# Patient Record
Sex: MALE | Race: OTHER | HISPANIC OR LATINO
[De-identification: names, ages, dates, MRNs, and addresses within clinical notes are randomized per-mention and may not be internally consistent; named-entity substitution may affect disease eponyms.]

---

## 2019-01-31 PROBLEM — Z00.00 ENCOUNTER FOR PREVENTIVE HEALTH EXAMINATION: Status: ACTIVE | Noted: 2019-01-31

## 2019-02-02 ENCOUNTER — APPOINTMENT (OUTPATIENT)
Dept: ORTHOPEDIC SURGERY | Facility: CLINIC | Age: 49
End: 2019-02-02
Payer: MEDICAID

## 2019-02-02 VITALS
DIASTOLIC BLOOD PRESSURE: 106 MMHG | WEIGHT: 219 LBS | BODY MASS INDEX: 35.2 KG/M2 | HEART RATE: 112 BPM | TEMPERATURE: 98.8 F | SYSTOLIC BLOOD PRESSURE: 150 MMHG | HEIGHT: 66 IN

## 2019-02-02 DIAGNOSIS — M25.561 PAIN IN RIGHT KNEE: ICD-10-CM

## 2019-02-02 DIAGNOSIS — Z86.39 PERSONAL HISTORY OF OTHER ENDOCRINE, NUTRITIONAL AND METABOLIC DISEASE: ICD-10-CM

## 2019-02-02 DIAGNOSIS — Z86.79 PERSONAL HISTORY OF OTHER DISEASES OF THE CIRCULATORY SYSTEM: ICD-10-CM

## 2019-02-02 DIAGNOSIS — S72.401A UNSPECIFIED FRACTURE OF LOWER END OF RIGHT FEMUR, INITIAL ENCOUNTER FOR CLOSED FRACTURE: ICD-10-CM

## 2019-02-02 PROCEDURE — 73564 X-RAY EXAM KNEE 4 OR MORE: CPT | Mod: RT

## 2019-02-02 PROCEDURE — 99203 OFFICE O/P NEW LOW 30 MIN: CPT

## 2019-02-02 RX ORDER — ATORVASTATIN CALCIUM 20 MG/1
20 TABLET, FILM COATED ORAL
Refills: 0 | Status: ACTIVE | COMMUNITY

## 2019-02-02 RX ORDER — AMLODIPINE BESYLATE 10 MG/1
10 TABLET ORAL
Refills: 0 | Status: ACTIVE | COMMUNITY

## 2019-02-02 RX ORDER — METFORMIN HYDROCHLORIDE 500 MG/1
500 TABLET, COATED ORAL
Refills: 0 | Status: ACTIVE | COMMUNITY

## 2019-02-02 RX ORDER — ASPIRIN 81 MG
81 TABLET, DELAYED RELEASE (ENTERIC COATED) ORAL
Refills: 0 | Status: ACTIVE | COMMUNITY

## 2019-02-02 RX ORDER — NYSTATIN 100000 [USP'U]/G
100000 CREAM TOPICAL
Refills: 0 | Status: ACTIVE | COMMUNITY

## 2019-02-02 NOTE — DISCUSSION/SUMMARY
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 Rodríguez will see in office to schedule surgery

## 2019-02-02 NOTE — REASON FOR VISIT
[Initial Visit] : an initial visit for [Knee Sprain] : knee sprain [Friend] : friend [FreeTextEntry2] : Right knee pain

## 2019-02-02 NOTE — PHYSICAL EXAM
[UE/LE] : Sensory: Intact in bilateral upper & lower extremities [ALL] : dorsalis pedis, posterior tibial, femoral, popliteal, and radial 2+ and symmetric bilaterally [Normal] : Oriented to person, place, and time, insight and judgement were intact and the affect was normal [Poor Appearance] : well-appearing [Acute Distress] : not in acute distress [de-identified] : \par FROM to hip\par \par Skin Warm, Dry, no erythema, no lesions \par \par Knee \par stable\par anatomic alignment\par ROM 0-130\par Valgus/Varus reveals laxity \par Effusion - Negative\par Crepitus - Negative \par Patella Grind - Negative \par Patella Apprehension - Negative \par Medial joint line tenderness - Negative\par Lateral Joint line tenderness - Negative\par Kay Test - Negative  \par Lachman test - POSITIVE \par Anterior Drawer Test -  POSITIVE\par \par Distal Motor Function intact \par Sensation intact to light touch bilaterally \par Pulses 2+ bilaterally\par  [de-identified] : 3 view right knee reveals 

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
## 2019-02-04 PROBLEM — S72.401A CLOSED FRACTURE OF DISTAL END OF RIGHT FEMUR, UNSPECIFIED FRACTURE MORPHOLOGY, INITIAL ENCOUNTER: Status: ACTIVE | Noted: 2019-02-02

## 2019-02-05 ENCOUNTER — APPOINTMENT (OUTPATIENT)
Dept: ORTHOPEDIC SURGERY | Facility: CLINIC | Age: 49
End: 2019-02-05
Payer: MEDICAID

## 2019-02-05 VITALS
HEART RATE: 116 BPM | TEMPERATURE: 98.9 F | BODY MASS INDEX: 35.36 KG/M2 | HEIGHT: 66 IN | SYSTOLIC BLOOD PRESSURE: 151 MMHG | DIASTOLIC BLOOD PRESSURE: 101 MMHG | WEIGHT: 220 LBS

## 2019-02-05 DIAGNOSIS — S72.401K: ICD-10-CM

## 2019-02-05 PROCEDURE — 99214 OFFICE O/P EST MOD 30 MIN: CPT

## 2019-02-05 NOTE — PHYSICAL EXAM
[UE/LE] : Sensory: Intact in bilateral upper & lower extremities [ALL] : dorsalis pedis, posterior tibial, femoral, popliteal, and radial 2+ and symmetric bilaterally [Normal] : Oriented to person, place, and time, insight and judgement were intact and the affect was normal [de-identified] : Xray from 1/31 of right knee reveals 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  [Poor Appearance] : well-appearing [Acute Distress] : not in acute distress [de-identified] : Physical Exam:\par General: Well appearing, no acute distress, A&O\par Neurologic: A&Ox3, No focal deficits\par Head: NCAT without abrasions, lacerations, or ecchymosis to head, face, or scalp\par Respiratory: Equal chest wall expansion bilaterally, no accessory muscle use\par Lymphatic: No lymphadenopathy palpated\par Skin: Warm and dry\par Psychiatric: Normal mood and affect\par \par FROM to hip\par \par Skin Warm, Dry, no erythema, no lesions \par \par Knee \par stable\par anatomic alignment\par ROM 0-130\par Valgus/Varus reveals laxity \par Effusion - Negative\par Crepitus - Negative \par Patella Grind - positive\par Patella Apprehension - Negative \par Medial joint line tenderness - Negative\par Lateral Joint line tenderness - Negative\par Kay Test - Negative  \par Lachman test - POSITIVE \par Anterior Drawer Test -  POSITIVE\par \par Distal Motor Function intact \par Sensation intact to light touch bilaterally \par Pulses 2+ bilaterally\par \par ambulates with anatalgic, varus thrust gait
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
## 2019-02-05 NOTE — DISCUSSION/SUMMARY
[de-identified] : Pt is 48y M with right distal femur intraarticular fracture, nonunion. Surgical intervention is recommended. Risks, benefits, alternatives discussed at length with patient and family.  Post operative recovery, including non-weight bearing for at least 4 weeks was reviewed as well. Patient would like to proceed with surgery, open reduction internal fixation. All questions answered and patient agrees with assessment/plan. \par \par Orthopaedic Trauma Surgeon Addendum:\par \par I agree with the above resident physician note.  Appropriate imaging has been reviewed and the plan adjusted as needed.\par \par It was explained to the patient that any surgical procedure carries with it the risks of loss of limb or loss of life. Medical complications include but are not limited to death or disability from a heart attack, stroke, GI bleeding, thrombophlebitis and pulmonary embolism, sepsis, adverse reactions including death due to blood transfusions, allergy or adverse drug reaction. There are other rare, unknown and uncommon systemic conditions that could also adversely affect the systemic outcome. Local complications include but are not limited to wound dehiscence, deep infection, failure of fixation or reconstruction, damage to nerves and vessels which could be temporary or permanent, as well as other rare, uncommon and unknown local complications that may necessitate re-operation, more complex orthopaedic reconstructions or amputation.\par \par The patient was given the opportunity to ask questions and all questions were answered to their satisfaction.\par \par Yash Rodríguez MD\par Orthopaedic Trauma Surgeon\par [facility]\par [facility]

## 2019-02-05 NOTE — REASON FOR VISIT
[Follow-Up Visit] : a follow-up visit for [Spouse] : spouse [Initial Visit] : an initial visit for [Knee Sprain] : knee sprain [Friend] : friend [FreeTextEntry2] : Right knee pain

## 2019-03-15 ENCOUNTER — APPOINTMENT (OUTPATIENT)
Dept: ORTHOPEDIC SURGERY | Facility: HOSPITAL | Age: 49
End: 2019-03-15

## 2021-06-18 ENCOUNTER — INPATIENT (INPATIENT)
Facility: HOSPITAL | Age: 51
LOS: 1 days | Discharge: HOME | DRG: 854 | End: 2021-06-20
Attending: HOSPITALIST | Admitting: HOSPITALIST
Payer: COMMERCIAL

## 2021-06-18 VITALS
HEART RATE: 99 BPM | HEIGHT: 66 IN | OXYGEN SATURATION: 96 % | DIASTOLIC BLOOD PRESSURE: 89 MMHG | RESPIRATION RATE: 19 BRPM | TEMPERATURE: 100 F | SYSTOLIC BLOOD PRESSURE: 145 MMHG | WEIGHT: 238.98 LBS

## 2021-06-18 DIAGNOSIS — M86.9 OSTEOMYELITIS, UNSPECIFIED: ICD-10-CM

## 2021-06-18 LAB
ALBUMIN SERPL ELPH-MCNC: 4.2 G/DL — SIGNIFICANT CHANGE UP (ref 3.3–5.2)
ALP SERPL-CCNC: 86 U/L — SIGNIFICANT CHANGE UP (ref 40–120)
ALT FLD-CCNC: 34 U/L — SIGNIFICANT CHANGE UP
ANION GAP SERPL CALC-SCNC: 12 MMOL/L — SIGNIFICANT CHANGE UP (ref 5–17)
AST SERPL-CCNC: 23 U/L — SIGNIFICANT CHANGE UP
BASOPHILS # BLD AUTO: 0.07 K/UL — SIGNIFICANT CHANGE UP (ref 0–0.2)
BASOPHILS NFR BLD AUTO: 0.5 % — SIGNIFICANT CHANGE UP (ref 0–2)
BILIRUB SERPL-MCNC: 0.3 MG/DL — LOW (ref 0.4–2)
BUN SERPL-MCNC: 17.1 MG/DL — SIGNIFICANT CHANGE UP (ref 8–20)
CALCIUM SERPL-MCNC: 9.6 MG/DL — SIGNIFICANT CHANGE UP (ref 8.6–10.2)
CHLORIDE SERPL-SCNC: 99 MMOL/L — SIGNIFICANT CHANGE UP (ref 98–107)
CO2 SERPL-SCNC: 25 MMOL/L — SIGNIFICANT CHANGE UP (ref 22–29)
CREAT SERPL-MCNC: 0.79 MG/DL — SIGNIFICANT CHANGE UP (ref 0.5–1.3)
CRP SERPL-MCNC: <4 MG/L — SIGNIFICANT CHANGE UP
EOSINOPHIL # BLD AUTO: 0.21 K/UL — SIGNIFICANT CHANGE UP (ref 0–0.5)
EOSINOPHIL NFR BLD AUTO: 1.6 % — SIGNIFICANT CHANGE UP (ref 0–6)
ERYTHROCYTE [SEDIMENTATION RATE] IN BLOOD: 18 MM/HR — SIGNIFICANT CHANGE UP (ref 0–20)
GLUCOSE SERPL-MCNC: 350 MG/DL — HIGH (ref 70–99)
HCT VFR BLD CALC: 39.8 % — SIGNIFICANT CHANGE UP (ref 39–50)
HGB BLD-MCNC: 13.2 G/DL — SIGNIFICANT CHANGE UP (ref 13–17)
IMM GRANULOCYTES NFR BLD AUTO: 0.6 % — SIGNIFICANT CHANGE UP (ref 0–1.5)
LACTATE BLDV-MCNC: 2.3 MMOL/L — HIGH (ref 0.5–2)
LACTATE BLDV-MCNC: 2.8 MMOL/L — HIGH (ref 0.5–2)
LYMPHOCYTES # BLD AUTO: 25.8 % — SIGNIFICANT CHANGE UP (ref 13–44)
LYMPHOCYTES # BLD AUTO: 3.44 K/UL — HIGH (ref 1–3.3)
MCHC RBC-ENTMCNC: 28.2 PG — SIGNIFICANT CHANGE UP (ref 27–34)
MCHC RBC-ENTMCNC: 33.2 GM/DL — SIGNIFICANT CHANGE UP (ref 32–36)
MCV RBC AUTO: 85 FL — SIGNIFICANT CHANGE UP (ref 80–100)
MONOCYTES # BLD AUTO: 0.97 K/UL — HIGH (ref 0–0.9)
MONOCYTES NFR BLD AUTO: 7.3 % — SIGNIFICANT CHANGE UP (ref 2–14)
NEUTROPHILS # BLD AUTO: 8.56 K/UL — HIGH (ref 1.8–7.4)
NEUTROPHILS NFR BLD AUTO: 64.2 % — SIGNIFICANT CHANGE UP (ref 43–77)
PLATELET # BLD AUTO: 336 K/UL — SIGNIFICANT CHANGE UP (ref 150–400)
POTASSIUM SERPL-MCNC: 4.2 MMOL/L — SIGNIFICANT CHANGE UP (ref 3.5–5.3)
POTASSIUM SERPL-SCNC: 4.2 MMOL/L — SIGNIFICANT CHANGE UP (ref 3.5–5.3)
PROT SERPL-MCNC: 7.5 G/DL — SIGNIFICANT CHANGE UP (ref 6.6–8.7)
RBC # BLD: 4.68 M/UL — SIGNIFICANT CHANGE UP (ref 4.2–5.8)
RBC # FLD: 12.4 % — SIGNIFICANT CHANGE UP (ref 10.3–14.5)
SARS-COV-2 RNA SPEC QL NAA+PROBE: SIGNIFICANT CHANGE UP
SODIUM SERPL-SCNC: 136 MMOL/L — SIGNIFICANT CHANGE UP (ref 135–145)
WBC # BLD: 13.33 K/UL — HIGH (ref 3.8–10.5)
WBC # FLD AUTO: 13.33 K/UL — HIGH (ref 3.8–10.5)

## 2021-06-18 PROCEDURE — 99223 1ST HOSP IP/OBS HIGH 75: CPT

## 2021-06-18 PROCEDURE — 99285 EMERGENCY DEPT VISIT HI MDM: CPT

## 2021-06-18 PROCEDURE — 73140 X-RAY EXAM OF FINGER(S): CPT | Mod: 26,RT

## 2021-06-18 RX ORDER — VANCOMYCIN HCL 1 G
1000 VIAL (EA) INTRAVENOUS ONCE
Refills: 0 | Status: COMPLETED | OUTPATIENT
Start: 2021-06-18 | End: 2021-06-18

## 2021-06-18 RX ORDER — KETOROLAC TROMETHAMINE 30 MG/ML
30 SYRINGE (ML) INJECTION ONCE
Refills: 0 | Status: DISCONTINUED | OUTPATIENT
Start: 2021-06-18 | End: 2021-06-18

## 2021-06-18 RX ORDER — PIPERACILLIN AND TAZOBACTAM 4; .5 G/20ML; G/20ML
3.38 INJECTION, POWDER, LYOPHILIZED, FOR SOLUTION INTRAVENOUS ONCE
Refills: 0 | Status: COMPLETED | OUTPATIENT
Start: 2021-06-18 | End: 2021-06-18

## 2021-06-18 RX ORDER — SODIUM CHLORIDE 9 MG/ML
2000 INJECTION INTRAMUSCULAR; INTRAVENOUS; SUBCUTANEOUS ONCE
Refills: 0 | Status: COMPLETED | OUTPATIENT
Start: 2021-06-18 | End: 2021-06-18

## 2021-06-18 RX ADMIN — Medication 30 MILLIGRAM(S): at 20:21

## 2021-06-18 RX ADMIN — SODIUM CHLORIDE 2000 MILLILITER(S): 9 INJECTION INTRAMUSCULAR; INTRAVENOUS; SUBCUTANEOUS at 21:45

## 2021-06-18 RX ADMIN — Medication 100 MILLIGRAM(S): at 20:57

## 2021-06-18 RX ADMIN — PIPERACILLIN AND TAZOBACTAM 200 GRAM(S): 4; .5 INJECTION, POWDER, LYOPHILIZED, FOR SOLUTION INTRAVENOUS at 20:21

## 2021-06-18 NOTE — H&P ADULT - PROBLEM SELECTOR PLAN 1
pt. had bedside I & D by ortho PA, packing done, cultures sent, will keep on vanco and zosyn. Hand surgeon dr. perez will see pt. in am, will request for ID consult.

## 2021-06-18 NOTE — H&P ADULT - NSHPPHYSICALEXAM_GEN_ALL_CORE
Vital Signs Last 24 Hrs  T(C): 36.8 (18 Jun 2021 23:31), Max: 37.7 (18 Jun 2021 18:18)  T(F): 98.2 (18 Jun 2021 23:31), Max: 99.8 (18 Jun 2021 18:18)  HR: 90 (18 Jun 2021 23:31) (90 - 99)  BP: 148/88 (18 Jun 2021 23:31) (145/89 - 148/88)  BP(mean): --  RR: 18 (18 Jun 2021 23:31) (18 - 19)  SpO2: 96% (18 Jun 2021 23:31) (96% - 96%)  General: Obese male lying in bed not in distress.   HEENT: AT, NC. PERRL. intact EOM. no throat erythema or exudate.   Neck: supple. no JVD.   Chest: CTA bilaterally  Heart: S1,S2. RRR. no heart murmur. no edema.   Abdomen: soft. non-tender. obese,+ BS.   Ext: no calf tenderness on both sides. Right Upper Extremity: +swelling, erythema and some pain of distal and middle phalanx of rt. 2nd digit. +2 small scabs (adjacent to nail bed on dorsal/radial side and on volar surface of distal phalanx). Small serosanguinous fluid able to be expressed from dorsal wound. No TTP. FROM of hand and digits. median/ulnar/radial nerve intact. Radial pulse 2+ B/L. left hand no wounds.   Neuro: AAO x3. no focal weakness. no speech disorder, cns ii to xii intact. m /r/s intact.   Skin: warm and dry, no pallor, no diaphoresis, other skin findings as in ext. exam.  psych : normal affect, no si/hi.

## 2021-06-18 NOTE — H&P ADULT - NSICDXFAMILYHX_GEN_ALL_CORE_FT
FAMILY HISTORY:  Family history of diabetes mellitus     FAMILY HISTORY:  Family history of diabetes mellitus, mother and sibling

## 2021-06-18 NOTE — ED PROVIDER NOTE - CLINICAL SUMMARY MEDICAL DECISION MAKING FREE TEXT BOX
50 y/o male with hx of HTN, DM, HLD presents to the ED c/o right finger pain and swelling of the 2nd digit. failed outpatient abx, fusiform swelling, with redness, no pain with passive extension, no pain with active flexion 50 y/o male with hx of HTN, DM, HLD presents to the ED c/o right finger pain and swelling of the 2nd digit. failed outpatient abx, fusiform swelling, with redness, no pain with passive extension, no pain with active flexion, xray concerning for osteo, elevated wbc, hand consulted, will admit to medicine

## 2021-06-18 NOTE — H&P ADULT - PROBLEM SELECTOR PROBLEM 3
Obesity (BMI 30-39.9) Type 2 diabetes mellitus with hyperglycemia, without long-term current use of insulin

## 2021-06-18 NOTE — ED ADULT NURSE NOTE - OBJECTIVE STATEMENT
pt presents with R pointer finger swelling and redness after pulling hang nail x 2 weeks. seen by PCP on amoxicillin, finished yesterday with no improvement. pt medicated as ordered. blood cultures drawn prior to administration of abx. pt updated on plan of care by PORTIA osuna.

## 2021-06-18 NOTE — ED PROVIDER NOTE - OBJECTIVE STATEMENT
50 y/o male with hx of HTN, DM, HLD presents to the ED c/o right finger pain and swelling of the 2nd digit. Notes began as a hang nail and has gradually worsened. Was seen 2 weeks ago by pcp and given amoxicillin for infection. Pt notes he just finished antibiotics yesterday but has not improved. Admits to some discharge of the finger. Generalized swelling. Denies fevers, chills, decreased range of motion, nausea, vomiting.

## 2021-06-18 NOTE — H&P ADULT - PROBLEM SELECTOR PLAN 2
will keep pt. on admelog. diabetic teaching. follow blood and abscess culture sent after I & D. VANCO / ZOSYN to continue, last lactate 2.3 , will give another bolus of NS and repeat lactate. pt's wbc [id number], hr 99, lactate initially 2.8 ,obvious source rt. finger felon, follow blood and abscess culture sent after I & D. VANCO / ZOSYN to continue, last lactate 2.3 , will give another bolus of NS and repeat lactate.

## 2021-06-18 NOTE — H&P ADULT - PROBLEM SELECTOR PLAN 3
pt. 's Obesity was discussed and pt. will benefit from losing wt. pt's DM appears uncontrolled, will keep pt. on admelog scale. lantus and diabetic teaching.

## 2021-06-18 NOTE — ED PROVIDER NOTE - ATTENDING CONTRIBUTION TO CARE
Patient with finger infection.  Lab values c/w infection.  XR with osteo.  ortho bedside.  abx given. patient admitted for definitive care.  VSS.  Non toxic.  Well appearing. Uneventful ED observation period.

## 2021-06-18 NOTE — ED ADULT NURSE NOTE - NSIMPLEMENTINTERV_GEN_ALL_ED
Implemented All Universal Safety Interventions:  [place] to call system. Call bell, personal items and telephone within reach. Instruct patient to call for assistance. Room bathroom lighting operational. Non-slip footwear when patient is off stretcher. Physically safe environment: no spills, clutter or unnecessary equipment. Stretcher in lowest position, wheels locked, appropriate side rails in place.

## 2021-06-18 NOTE — ED PROVIDER NOTE - CARE PLAN
Principal Discharge DX:	Osteomyelitis   Principal Discharge DX:	Osteomyelitis  Secondary Diagnosis:	Felon of finger

## 2021-06-18 NOTE — ED ADULT TRIAGE NOTE - CHIEF COMPLAINT QUOTE
Pt. sent in by MD for evaluation of right 2nd finger pain and swelling that began 1.5 weeks ago.  Pt. states "I gave myself ampicillin over the past week for this but it didn't help, my doctor told me to come in."  Pt. states wound began "as an ingrown nail."  Sensation intact.  Pt. denies pain.

## 2021-06-18 NOTE — ED PROVIDER NOTE - PHYSICAL EXAMINATION
General-alert and oriented to person place and time, nontoxic appearing, pleasant cooperative, NAD  HEENT-normocephalic, atraumatic, NT to palp, EOMI, PERRLA, no conjunctival injections,   Chest- Nt to palp, no reproducible pain  Cardio-s1,s2 present, regular rate and rhythm  Resp- talks in full sentences, symmetrical chest rise, CTA bilat, no evidence of wheezes, rhonchi noted  Abdomen- bowel sounds present in all 4 quadrants, soft, NT/ND, no guarding, no rebound tenderness  MSK- moves all extremities, FROM of the finger, no pain with passive extension, no pain with active flexion  Back- nt to palp of cervical, thoracic, lumbar spine, nt to palp of paraspinal m., No CVA tenderness  Neuro- no focal deficits, sensation intact   Skin- fusiform swelling of the 2nd digit, scab noted to the medial cuticle and palmar aspect, no active discharge

## 2021-06-18 NOTE — H&P ADULT - ASSESSMENT
pt. is a 52 y/o male with hx of HTN, DM, HLD presents to the ED c/o right 2nd finger pain and swelling mostly in the middle and distal phalanx area. As per pt. began as a hang nail and has gradually worsened. pt. was amoxicillin for infection which he likely got it from a [place]. Pt notes he just finished antibiotics yesterday but has not improved. Admits to some discharge of the finger.Denies fevers, chills, decreased range of motion of rt. hand or fingers , nausea, vomiting. no cp, no sob, no abd. pain, no n/v/d. pt. stated that is only medicine is metformin.

## 2021-06-18 NOTE — H&P ADULT - HISTORY OF PRESENT ILLNESS
pt. is a 50 y/o male with hx of HTN, DM, HLD presents to the ED c/o right 2nd finger pain mostly in the distal phalanx area. As per pt. began as a hang nail and has gradually worsened. Was seen 2 weeks ago by pcp and given amoxicillin for infection. Pt notes he just finished antibiotics yesterday but has not improved. Admits to some discharge of the finger.Denies fevers, chills, decreased range of motion of rt. hand or fingers , nausea, vomiting. no cp, no sob, no abd. pain, no n/v/d.  pt. is a 50 y/o male with hx of HTN, DM, HLD presents to the ED c/o right 2nd finger pain and swelling mostly in the middle and distal phalanx area. As per pt. began as a hang nail and has gradually worsened. Was seen 2 weeks ago by pcp and given amoxicillin for infection. Pt notes he just finished antibiotics yesterday but has not improved. Admits to some discharge of the finger.Denies fevers, chills, decreased range of motion of rt. hand or fingers , nausea, vomiting. no cp, no sob, no abd. pain, no n/v/d. pt. stated that is only medicine is metformin.  pt. is a 52 y/o male with hx of HTN, DM, HLD presents to the ED c/o right 2nd finger pain and swelling mostly in the middle and distal phalanx area. As per pt. began as a hang nail and has gradually worsened. pt. was amoxicillin for infection which he likely got it from a [place]. Pt notes he just finished antibiotics yesterday but has not improved. Admits to some discharge of the finger.Denies fevers, chills, decreased range of motion of rt. hand or fingers , nausea, vomiting. no cp, no sob, no abd. pain, no n/v/d. pt. stated that is only medicine is metformin.

## 2021-06-19 DIAGNOSIS — E11.65 TYPE 2 DIABETES MELLITUS WITH HYPERGLYCEMIA: ICD-10-CM

## 2021-06-19 DIAGNOSIS — A41.9 SEPSIS, UNSPECIFIED ORGANISM: ICD-10-CM

## 2021-06-19 DIAGNOSIS — E66.9 OBESITY, UNSPECIFIED: ICD-10-CM

## 2021-06-19 DIAGNOSIS — L03.011 CELLULITIS OF RIGHT FINGER: ICD-10-CM

## 2021-06-19 LAB
A1C WITH ESTIMATED AVERAGE GLUCOSE RESULT: 10.3 % — HIGH (ref 4–5.6)
ANION GAP SERPL CALC-SCNC: 9 MMOL/L — SIGNIFICANT CHANGE UP (ref 5–17)
BASOPHILS # BLD AUTO: 0.05 K/UL — SIGNIFICANT CHANGE UP (ref 0–0.2)
BASOPHILS NFR BLD AUTO: 0.4 % — SIGNIFICANT CHANGE UP (ref 0–2)
BUN SERPL-MCNC: 18.3 MG/DL — SIGNIFICANT CHANGE UP (ref 8–20)
CALCIUM SERPL-MCNC: 8.3 MG/DL — LOW (ref 8.6–10.2)
CHLORIDE SERPL-SCNC: 105 MMOL/L — SIGNIFICANT CHANGE UP (ref 98–107)
CHOLEST SERPL-MCNC: 118 MG/DL — SIGNIFICANT CHANGE UP
CO2 SERPL-SCNC: 23 MMOL/L — SIGNIFICANT CHANGE UP (ref 22–29)
CREAT SERPL-MCNC: 0.69 MG/DL — SIGNIFICANT CHANGE UP (ref 0.5–1.3)
EOSINOPHIL # BLD AUTO: 0.25 K/UL — SIGNIFICANT CHANGE UP (ref 0–0.5)
EOSINOPHIL NFR BLD AUTO: 2 % — SIGNIFICANT CHANGE UP (ref 0–6)
ESTIMATED AVERAGE GLUCOSE: 249 MG/DL — HIGH (ref 68–114)
GLUCOSE BLDC GLUCOMTR-MCNC: 244 MG/DL — HIGH (ref 70–99)
GLUCOSE BLDC GLUCOMTR-MCNC: 253 MG/DL — HIGH (ref 70–99)
GLUCOSE BLDC GLUCOMTR-MCNC: 270 MG/DL — HIGH (ref 70–99)
GLUCOSE BLDC GLUCOMTR-MCNC: 272 MG/DL — HIGH (ref 70–99)
GLUCOSE BLDC GLUCOMTR-MCNC: 276 MG/DL — HIGH (ref 70–99)
GLUCOSE BLDC GLUCOMTR-MCNC: 299 MG/DL — HIGH (ref 70–99)
GLUCOSE BLDC GLUCOMTR-MCNC: 328 MG/DL — HIGH (ref 70–99)
GLUCOSE SERPL-MCNC: 324 MG/DL — HIGH (ref 70–99)
HCT VFR BLD CALC: 35.9 % — LOW (ref 39–50)
HDLC SERPL-MCNC: 29 MG/DL — LOW
HGB BLD-MCNC: 11.8 G/DL — LOW (ref 13–17)
IMM GRANULOCYTES NFR BLD AUTO: 0.5 % — SIGNIFICANT CHANGE UP (ref 0–1.5)
LACTATE BLDV-MCNC: 2.1 MMOL/L — HIGH (ref 0.5–2)
LIPID PNL WITH DIRECT LDL SERPL: 49 MG/DL — SIGNIFICANT CHANGE UP
LYMPHOCYTES # BLD AUTO: 26.4 % — SIGNIFICANT CHANGE UP (ref 13–44)
LYMPHOCYTES # BLD AUTO: 3.3 K/UL — SIGNIFICANT CHANGE UP (ref 1–3.3)
MCHC RBC-ENTMCNC: 28.5 PG — SIGNIFICANT CHANGE UP (ref 27–34)
MCHC RBC-ENTMCNC: 32.9 GM/DL — SIGNIFICANT CHANGE UP (ref 32–36)
MCV RBC AUTO: 86.7 FL — SIGNIFICANT CHANGE UP (ref 80–100)
MONOCYTES # BLD AUTO: 1.07 K/UL — HIGH (ref 0–0.9)
MONOCYTES NFR BLD AUTO: 8.5 % — SIGNIFICANT CHANGE UP (ref 2–14)
NEUTROPHILS # BLD AUTO: 7.79 K/UL — HIGH (ref 1.8–7.4)
NEUTROPHILS NFR BLD AUTO: 62.2 % — SIGNIFICANT CHANGE UP (ref 43–77)
NON HDL CHOLESTEROL: 89 MG/DL — SIGNIFICANT CHANGE UP
PLATELET # BLD AUTO: 288 K/UL — SIGNIFICANT CHANGE UP (ref 150–400)
POTASSIUM SERPL-MCNC: 4.2 MMOL/L — SIGNIFICANT CHANGE UP (ref 3.5–5.3)
POTASSIUM SERPL-SCNC: 4.2 MMOL/L — SIGNIFICANT CHANGE UP (ref 3.5–5.3)
RBC # BLD: 4.14 M/UL — LOW (ref 4.2–5.8)
RBC # FLD: 12.5 % — SIGNIFICANT CHANGE UP (ref 10.3–14.5)
SODIUM SERPL-SCNC: 137 MMOL/L — SIGNIFICANT CHANGE UP (ref 135–145)
TRIGL SERPL-MCNC: 199 MG/DL — HIGH
WBC # BLD: 12.52 K/UL — HIGH (ref 3.8–10.5)
WBC # FLD AUTO: 12.52 K/UL — HIGH (ref 3.8–10.5)

## 2021-06-19 PROCEDURE — 99233 SBSQ HOSP IP/OBS HIGH 50: CPT

## 2021-06-19 PROCEDURE — 99222 1ST HOSP IP/OBS MODERATE 55: CPT

## 2021-06-19 RX ORDER — SODIUM CHLORIDE 9 MG/ML
1000 INJECTION, SOLUTION INTRAVENOUS
Refills: 0 | Status: DISCONTINUED | OUTPATIENT
Start: 2021-06-19 | End: 2021-06-20

## 2021-06-19 RX ORDER — INSULIN LISPRO 100/ML
5 VIAL (ML) SUBCUTANEOUS ONCE
Refills: 0 | Status: COMPLETED | OUTPATIENT
Start: 2021-06-19 | End: 2021-06-19

## 2021-06-19 RX ORDER — ENOXAPARIN SODIUM 100 MG/ML
40 INJECTION SUBCUTANEOUS DAILY
Refills: 0 | Status: DISCONTINUED | OUTPATIENT
Start: 2021-06-19 | End: 2021-06-20

## 2021-06-19 RX ORDER — INSULIN LISPRO 100/ML
2 VIAL (ML) SUBCUTANEOUS
Refills: 0 | Status: DISCONTINUED | OUTPATIENT
Start: 2021-06-19 | End: 2021-06-19

## 2021-06-19 RX ORDER — INSULIN GLARGINE 100 [IU]/ML
12 INJECTION, SOLUTION SUBCUTANEOUS ONCE
Refills: 0 | Status: COMPLETED | OUTPATIENT
Start: 2021-06-19 | End: 2021-06-19

## 2021-06-19 RX ORDER — GLUCAGON INJECTION, SOLUTION 0.5 MG/.1ML
1 INJECTION, SOLUTION SUBCUTANEOUS ONCE
Refills: 0 | Status: DISCONTINUED | OUTPATIENT
Start: 2021-06-19 | End: 2021-06-20

## 2021-06-19 RX ORDER — INSULIN LISPRO 100/ML
5 VIAL (ML) SUBCUTANEOUS
Refills: 0 | Status: DISCONTINUED | OUTPATIENT
Start: 2021-06-19 | End: 2021-06-20

## 2021-06-19 RX ORDER — PIPERACILLIN AND TAZOBACTAM 4; .5 G/20ML; G/20ML
3.38 INJECTION, POWDER, LYOPHILIZED, FOR SOLUTION INTRAVENOUS EVERY 8 HOURS
Refills: 0 | Status: DISCONTINUED | OUTPATIENT
Start: 2021-06-19 | End: 2021-06-20

## 2021-06-19 RX ORDER — LISINOPRIL 2.5 MG/1
10 TABLET ORAL DAILY
Refills: 0 | Status: DISCONTINUED | OUTPATIENT
Start: 2021-06-19 | End: 2021-06-20

## 2021-06-19 RX ORDER — DEXTROSE 50 % IN WATER 50 %
12.5 SYRINGE (ML) INTRAVENOUS ONCE
Refills: 0 | Status: DISCONTINUED | OUTPATIENT
Start: 2021-06-19 | End: 2021-06-20

## 2021-06-19 RX ORDER — INSULIN GLARGINE 100 [IU]/ML
12 INJECTION, SOLUTION SUBCUTANEOUS AT BEDTIME
Refills: 0 | Status: DISCONTINUED | OUTPATIENT
Start: 2021-06-19 | End: 2021-06-19

## 2021-06-19 RX ORDER — DEXTROSE 50 % IN WATER 50 %
25 SYRINGE (ML) INTRAVENOUS ONCE
Refills: 0 | Status: DISCONTINUED | OUTPATIENT
Start: 2021-06-19 | End: 2021-06-20

## 2021-06-19 RX ORDER — INSULIN LISPRO 100/ML
VIAL (ML) SUBCUTANEOUS
Refills: 0 | Status: DISCONTINUED | OUTPATIENT
Start: 2021-06-19 | End: 2021-06-20

## 2021-06-19 RX ORDER — VANCOMYCIN HCL 1 G
1250 VIAL (EA) INTRAVENOUS EVERY 12 HOURS
Refills: 0 | Status: DISCONTINUED | OUTPATIENT
Start: 2021-06-19 | End: 2021-06-20

## 2021-06-19 RX ORDER — SODIUM CHLORIDE 9 MG/ML
1000 INJECTION INTRAMUSCULAR; INTRAVENOUS; SUBCUTANEOUS ONCE
Refills: 0 | Status: COMPLETED | OUTPATIENT
Start: 2021-06-19 | End: 2021-06-19

## 2021-06-19 RX ORDER — DEXTROSE 50 % IN WATER 50 %
15 SYRINGE (ML) INTRAVENOUS ONCE
Refills: 0 | Status: DISCONTINUED | OUTPATIENT
Start: 2021-06-19 | End: 2021-06-20

## 2021-06-19 RX ORDER — INSULIN GLARGINE 100 [IU]/ML
15 INJECTION, SOLUTION SUBCUTANEOUS AT BEDTIME
Refills: 0 | Status: DISCONTINUED | OUTPATIENT
Start: 2021-06-19 | End: 2021-06-20

## 2021-06-19 RX ORDER — ACETAMINOPHEN 500 MG
650 TABLET ORAL EVERY 6 HOURS
Refills: 0 | Status: DISCONTINUED | OUTPATIENT
Start: 2021-06-19 | End: 2021-06-20

## 2021-06-19 RX ORDER — LACTOBACILLUS ACIDOPHILUS 100MM CELL
1 CAPSULE ORAL
Refills: 0 | Status: DISCONTINUED | OUTPATIENT
Start: 2021-06-19 | End: 2021-06-20

## 2021-06-19 RX ORDER — INSULIN LISPRO 100/ML
VIAL (ML) SUBCUTANEOUS AT BEDTIME
Refills: 0 | Status: DISCONTINUED | OUTPATIENT
Start: 2021-06-19 | End: 2021-06-20

## 2021-06-19 RX ADMIN — Medication 1 TABLET(S): at 09:15

## 2021-06-19 RX ADMIN — Medication 1 TABLET(S): at 12:59

## 2021-06-19 RX ADMIN — Medication 5 UNIT(S): at 17:21

## 2021-06-19 RX ADMIN — Medication 250 MILLIGRAM(S): at 00:51

## 2021-06-19 RX ADMIN — PIPERACILLIN AND TAZOBACTAM 25 GRAM(S): 4; .5 INJECTION, POWDER, LYOPHILIZED, FOR SOLUTION INTRAVENOUS at 13:30

## 2021-06-19 RX ADMIN — Medication 5 UNIT(S): at 01:01

## 2021-06-19 RX ADMIN — Medication 6: at 17:21

## 2021-06-19 RX ADMIN — Medication 1 TABLET(S): at 17:21

## 2021-06-19 RX ADMIN — PIPERACILLIN AND TAZOBACTAM 25 GRAM(S): 4; .5 INJECTION, POWDER, LYOPHILIZED, FOR SOLUTION INTRAVENOUS at 22:32

## 2021-06-19 RX ADMIN — INSULIN GLARGINE 15 UNIT(S): 100 INJECTION, SOLUTION SUBCUTANEOUS at 22:33

## 2021-06-19 RX ADMIN — Medication 166.67 MILLIGRAM(S): at 13:00

## 2021-06-19 RX ADMIN — INSULIN GLARGINE 12 UNIT(S): 100 INJECTION, SOLUTION SUBCUTANEOUS at 04:38

## 2021-06-19 RX ADMIN — Medication 5 UNIT(S): at 12:55

## 2021-06-19 RX ADMIN — Medication 4: at 11:26

## 2021-06-19 RX ADMIN — ENOXAPARIN SODIUM 40 MILLIGRAM(S): 100 INJECTION SUBCUTANEOUS at 12:59

## 2021-06-19 RX ADMIN — LISINOPRIL 10 MILLIGRAM(S): 2.5 TABLET ORAL at 09:15

## 2021-06-19 RX ADMIN — Medication 2 UNIT(S): at 08:03

## 2021-06-19 RX ADMIN — Medication 2: at 22:33

## 2021-06-19 RX ADMIN — SODIUM CHLORIDE 1000 MILLILITER(S): 9 INJECTION INTRAMUSCULAR; INTRAVENOUS; SUBCUTANEOUS at 00:52

## 2021-06-19 RX ADMIN — Medication 6: at 08:01

## 2021-06-19 RX ADMIN — Medication 30 MILLIGRAM(S): at 09:05

## 2021-06-19 RX ADMIN — Medication 5 MILLIGRAM(S): at 06:35

## 2021-06-19 RX ADMIN — PIPERACILLIN AND TAZOBACTAM 25 GRAM(S): 4; .5 INJECTION, POWDER, LYOPHILIZED, FOR SOLUTION INTRAVENOUS at 05:44

## 2021-06-19 RX ADMIN — SODIUM CHLORIDE 125 MILLILITER(S): 9 INJECTION, SOLUTION INTRAVENOUS at 22:32

## 2021-06-19 NOTE — CONSULT NOTE ADULT - SUBJECTIVE AND OBJECTIVE BOX
ORTHO-HAND SERVICE    Pt Name: OSCAR REYES  MRN: [id number]      Patient is a 51y Male presenting to the emergency department with a chief complaint of right 2nd finger infection. Patient seen was assistance of ED . Patient states that about 2 weeks ago he had a hang nail that he pulled on. Since then his finger started getting swollen and red. He self-prescribed himself with penicillin from a [facility]odega which didn't improve symptoms. Finished antibiotic course yesterday. Starting last night finger started draining  blood/pus, but has since stopped. States that the swelling causes his skin to peel and he manually removed skin. Denies numbness/tingling, denies any pain currently. Denies fevers/chills. No other orthopedic complaints.          REVIEW OF SYSTEMS    General: Alert, responsive, in NAD    Skin/Breast: No rashes, no pruritis     Respiratory and Thorax: No difficulty breathing. No cough.  	   Cardiovascular: No chest pain. No palpitations.    Gastrointestinal: No abdominal pain. No diarrhea.     Musculoskeletal: SEE HPI.    Neurological: No sensory or motor changes.     ROS is otherwise negative.      PAST MEDICAL & SURGICAL HISTORY:  PAST MEDICAL & SURGICAL HISTORY:  HTN (hypertension)    Diabetes    HLD (hyperlipidemia)    No significant past surgical history        Allergies: No Known Allergies      Medications: vancomycin  IVPB. 1000 milliGRAM(s) IV Intermittent once      FAMILY HISTORY:  Family history of diabetes mellitus    : non-contributory    Social History:   Social History:      Daily Height in cm: 167.64 (18 Jun 2021 18:18)    Daily                             13.2   13.33 )-----------( 336      ( 18 Jun 2021 20:35 )             39.8       06-18    136  |  99  |  17.1  ----------------------------<  350<H>  4.2   |  25.0  |  0.79    Ca    9.6      18 Jun 2021 20:35    TPro  7.5  /  Alb  4.2  /  TBili  0.3<L>  /  DBili  x   /  AST  23  /  ALT  34  /  AlkPhos  86  06-18        PHYSICAL EXAM:    Appearance: Alert, responsive, in no acute distress.    Musculoskeletal:       Right Upper Extremity: +swelling, erythema of distal and middle phalanx of 2nd digit. +2 small scabs (adjacent to nail bed on dorsal/radial side and on volar surface of distal phalanx). Small serosanguinous fluid able to be expressed from dorsal wound. No TTP. FROM of hand and digits. median/ulnar/radial nerve intact. Radial pulse 2+         Imaging Studies:  official reads pending  Xray fingers right: +2nd digit distal phalanx with evidence of possible osteomyelitis/bone loss        A/P:  Pt is a  51y Male with right 2nd digit felon    PLAN:   * Bedside I&D performed  * F/U cultures  * IV ABX, recc ID consult  * elevation  * packing changes  * monitor for improvement        Incision and Drainage  PROCEDURE NOTE: incision and drainage    Performed by: Freddie Patiño PA-C    Indication: abscess/felon    Consent: the risks and benefits of incision and drainage discussed with patient, including the risk of bleeding, infection, and technical failure. The risks of not performing the procedure, failure to diagnose septic joint with resultant systemic infection, discussed with the patient. The alternatives of performing the procedure also discussed. Written consent was obtained following the discussion.  assisted with consent.    Universal Protocol: A time out was performed and the correct patient and site were verified.    The affected site was prepped and draped in proper sterile fashion. The right second digit was anesthetized with 10 ml of 1% lidocaine. Finger tourniquet used for hemostasis. A #15 blade was used to create an incision over the area of fluctuation. Purulent fluid was drained from incision. Cultures were taken. The site was packed. A dry sterile dressing was applied to site. The fluid was then sent to the lab for appropriate studies. Tourniquet was removed. The site was bandaged and no complications were noted. The patient tolerated the procedure well.    Post-Procedure Diagnosis: 2nd digit felon  Complications: None  Specimens Removed: fluid only  Prosthetic devices/implants:  none
[facility]  INFECTIOUS DISEASES AND INTERNAL MEDICINE at [place]  =======================================================  Kristian Walker MD  Diplomates American Board of Internal Medicine and Infectious Diseases  Tel  [phone]  Fax [phone]  =======================================================    [facility]-[id number]  OSCAR REYES   HPI:  This  52 y/o male with hx of HTN, DM, HLD presents to the ED c/o right 2nd finger pain and swelling mostly in the middle and distal phalanx area. As per pt. began as a hang nail and has gradually worsened. pt. was amoxicillin for infection which he likely got it from a [place] Pt notes he just finished antibiotics yesterday but has not improved. Admits to some discharge of the finger. Denies fevers, chills, decreased range of motion of rt. hand or fingers , nausea, vomiting. no cp, no sob, no abd. pain, no n/v/d. pt. stated that is only medicine is metformin.  (18 Jun 2021 23:58)    he presented to his PMD after 10 days and the was sent to the ER>   s/p I&D of the right medial #2 finger tip.  packing in place    has some pain.   denies nail biting   works as a     I have personally reviewed the labs and data; pertinent labs and data are listed in this note; please see below.   =======================================================  Past Medical & Surgical Hx:  =====================  PAST MEDICAL & SURGICAL HISTORY:  HTN (hypertension)    Diabetes    HLD (hyperlipidemia)    No significant past surgical history      Problem List:  ==========  HEALTH ISSUES - PROBLEM Dx:  Felon of finger of right hand  Felon of finger of right hand    Type 2 diabetes mellitus with hyperglycemia, without long-term current use of insulin  Type 2 diabetes mellitus with hyperglycemia, without long-term current use of insulin    Obesity (BMI 30-39.9)  Obesity (BMI 30-39.9)    Sepsis without acute organ dysfunction, due to unspecified organism  Sepsis without acute organ dysfunction, due to unspecified organism           Social Hx:  =======  no toxic habits currently    FAMILY HISTORY:  Family history of diabetes mellitus  mother and sibling    no significant family history of immunosuppressive disorders in mother or father   =======================================================    REVIEW OF SYSTEMS:  CONSTITUTIONAL:  No Fever or chills  HEENT:  No diplopia or blurred vision.  No earache, sore throat or runny nose.  CARDIOVASCULAR:  No pressure, squeezing, strangling, tightness, heaviness or aching about the chest, neck, axilla or epigastrium.  RESPIRATORY:  No cough, shortness of breath  GASTROINTESTINAL:  No nausea, vomiting or diarrhea.  GENITOURINARY:  No dysuria, frequency or urgency. No Blood in urine  MUSCULOSKELETAL:  no joint aches, no muscle pain  SKIN: as per HPI  NEUROLOGIC:  No Headaches, seizures or weakness.  PSYCHIATRIC:  No disorder of thought or mood.  ENDOCRINE:  No heat or cold intolerance  HEMATOLOGICAL:  No easy bruising or bleeding.    =======================================================  Allergies    No Known Allergies    Intolerances    Antibiotics:  piperacillin/tazobactam IVPB.. 3.375 Gram(s) IV Intermittent every 8 hours  vancomycin  IVPB 1250 milliGRAM(s) IV Intermittent every 12 hours    Other medications:  dextrose 40% Gel 15 Gram(s) Oral once  dextrose 5%. 1000 milliLiter(s) IV Continuous <Continuous>  dextrose 5%. 1000 milliLiter(s) IV Continuous <Continuous>  dextrose 50% Injectable 25 Gram(s) IV Push once  dextrose 50% Injectable 12.5 Gram(s) IV Push once  dextrose 50% Injectable 25 Gram(s) IV Push once  enoxaparin Injectable 40 milliGRAM(s) SubCutaneous daily  glucagon  Injectable 1 milliGRAM(s) IntraMuscular once  insulin glargine Injectable (LANTUS) 15 Unit(s) SubCutaneous at bedtime  insulin lispro (ADMELOG) corrective regimen sliding scale   SubCutaneous three times a day before meals  insulin lispro (ADMELOG) corrective regimen sliding scale   SubCutaneous at bedtime  insulin lispro Injectable (ADMELOG) 5 Unit(s) SubCutaneous three times a day with meals  lactated ringers. 1000 milliLiter(s) IV Continuous <Continuous>  lactobacillus acidophilus 1 Tablet(s) Oral three times a day with meals  lisinopril 10 milliGRAM(s) Oral daily     clindamycin IVPB   100 mL/Hr IV Intermittent (06-18-21 @ 20:57)    piperacillin/tazobactam IVPB..   25 mL/Hr IV Intermittent (06-19-21 @ 05:44)    piperacillin/tazobactam IVPB...   200 mL/Hr IV Intermittent (06-18-21 @ 20:21)    vancomycin  IVPB.   250 mL/Hr IV Intermittent (06-19-21 @ 00:51)      ======================================================  Physical Exam:  ============  T(F): 98.6 (19 Jun 2021 11:32), Max: 99.8 (18 Jun 2021 18:18)  HR: 86 (19 Jun 2021 11:32)  BP: 157/106 (19 Jun 2021 11:32)  RR: 19 (19 Jun 2021 08:03)  SpO2: 95% (19 Jun 2021 08:03) (94% - 96%)  temp max in last 48H T(F): , Max: 99.8 (06-18-21 @ 18:18)Height (cm): 167.6 (06-18-21 @ 18:18)  Weight (kg): 108.4 (06-18-21 @ 18:18)  BMI (kg/m2): 38.6 (06-18-21 @ 18:18)  BSA (m2): 2.16 (06-18-21 @ 18:18)    General:  No acute distress.  OBESE  Eye: Pupils are equal, round and reactive to light, Extraocular movements are intact, Normal conjunctiva.  HENT: Normocephalic, Oral mucosa is moist, No pharyngeal erythema, No sinus tenderness.  Neck: Supple, No lymphadenopathy.  Respiratory: Lungs are clear to auscultation, Respirations are non-labored.  Cardiovascular: Normal rate, Regular rhythm,   Gastrointestinal: Soft, Non-tender, Non-distended, Normal bowel sounds.  Genitourinary: No costovertebral angle tenderness.  Lymphatics: No lymphadenopathy neck,   Musculoskeletal: Normal range of motion, Normal strength.  Integumentary: RIGHT #2 finger tip with incision and PACKING in the medial finger.  FINGER TIP ENLARGED AND RED.   Neurologic: Alert, Oriented, No focal deficits, Cranial Nerves II-XII are grossly intact.  Psychiatric: Appropriate mood & affect.    =======================================================  Labs:                        11.8   12.52 )-----------( 288      ( 19 Jun 2021 04:23 )             35.9     06-19    137  |  105  |  18.3  ----------------------------<  324<H>  4.2   |  23.0  |  0.69    Ca    8.3<L>      19 Jun 2021 04:23    TPro  7.5  /  Alb  4.2  /  TBili  0.3<L>  /  DBili  x   /  AST  23  /  ALT  34  /  AlkPhos  86  06-18      Creatinine, Serum: 0.69 mg/dL (06-19-21 @ 04:23)  Creatinine, Serum: 0.79 mg/dL (06-18-21 @ 20:35)    C-Reactive Protein, Serum: <4 mg/L (06-18-21 @ 20:35)    Sedimentation Rate, Erythrocyte: 18 mm/hr (06-18-21 @ 20:35)        COVID-19 PCR: NotDetec (06-18-21 @ 21:08)       A1C with Estimated Average Glucose Result: 10.3 % (06-19-21 @ 04:23)

## 2021-06-19 NOTE — CONSULT NOTE ADULT - ASSESSMENT
This  52 y/o male with hx of HTN, DM, HLD presents to the ED c/o right 2nd finger pain and swelling mostly in the middle and distal phalanx area. As per pt. began as a hang nail and has gradually worsened. pt. was amoxicillin for infection which he likely got it from a [place] Pt notes he just finished antibiotics yesterday but has not improved. Admits to some discharge of the finger. Denies fevers, chills, decreased range of motion of rt. hand or fingers , nausea, vomiting. no cp, no sob, no abd. pain, no n/v/d. pt. stated that is only medicine is metformin.  (18 Jun 2021 23:58)    he presented to his PMD after 10 days and the was sent to the ER>   s/p I&D of the right medial #2 finger tip.  packing in place    has some pain.   denies nail biting   works as a     Impression:  finger infection  finger pain  WBC elevation  diabetes in poor control      Plan:  continue current antibiotics:  piperacillin/tazobactam IVPB.. 3.375 Gram(s) IV Intermittent every 8 hours  vancomycin  IVPB 1250 milliGRAM(s) IV Intermittent every 12 hours    Regarding Diabetes - poor control  - needs adequate control for wound healing  - most recent A1c is: 10.3    WBC elevation is reactive  - will follow and trend    - follow up all outstanding cultures - WOUND and BLOOD  - trend temperature and WBC curve  - repeat cultures from blood and all sources if febrile.

## 2021-06-19 NOTE — PROGRESS NOTE ADULT - SUBJECTIVE AND OBJECTIVE BOX
OSCAR REYES    [id number]    51y      Male    Patient is a 51y old  Male who presents with a chief complaint of     INTERVAL HPI/OVERNIGHT EVENTS:    Patient pain in the finger is well controlled, has no fever, chills, chest pain, nausea, vomiting, dizziness   REVIEW OF SYSTEMS:    CONSTITUTIONAL: No fever, some fatigue  RESPIRATORY: No cough, No shortness of breath  CARDIOVASCULAR: No chest pain, palpitations  GASTROINTESTINAL: No abdominal, No nausea, vomiting  NEUROLOGICAL: No headaches,  loss of strength.  MISCELLANEOUS: right 2nd finger pain is under control       Vital Signs Last 24 Hrs  T(C): 36.9 (19 Jun 2021 08:03), Max: 37.7 (18 Jun 2021 18:18)  T(F): 98.5 (19 Jun 2021 08:03), Max: 99.8 (18 Jun 2021 18:18)  HR: 87 (19 Jun 2021 08:03) (87 - 99)  BP: 163/111 (19 Jun 2021 04:45) (145/89 - 163/111)  RR: 19 (19 Jun 2021 08:03) (18 - 19)  SpO2: 95% (19 Jun 2021 08:03) (94% - 96%)    PHYSICAL EXAM:    GENERAL: Young Obese male looking comfortable   HEENT: PERRL, +EOMI  NECK: soft, Supple, No JVD,   CHEST/LUNG: Clear to auscultate bilaterally; No wheezing  HEART: S1S2+, Regular rate and rhythm; No murmurs  ABDOMEN: Soft, Nontender, Nondistended; Bowel sounds present  EXTREMITIES:  2+ Peripheral Pulses, No edema, right 2nd finger with dressing on, some soaking, no bleeding  SKIN: No rashes or lesions  NEURO: AAOX3, no focal deficits, no motor r sensory loss  PSYCH: normal mood      LABS:                        11.8   12.52 )-----------( 288      ( 19 Jun 2021 04:23 )             35.9     06-19    137  |  105  |  18.3  ----------------------------<  324<H>  4.2   |  23.0  |  0.69    Ca    8.3<L>      19 Jun 2021 04:23    TPro  7.5  /  Alb  4.2  /  TBili  0.3<L>  /  DBili  x   /  AST  23  /  ALT  34  /  AlkPhos  86  06-18            I&O's Summary      MEDICATIONS  (STANDING):  dextrose 40% Gel 15 Gram(s) Oral once  dextrose 5%. 1000 milliLiter(s) (50 mL/Hr) IV Continuous <Continuous>  dextrose 5%. 1000 milliLiter(s) (100 mL/Hr) IV Continuous <Continuous>  dextrose 50% Injectable 25 Gram(s) IV Push once  dextrose 50% Injectable 12.5 Gram(s) IV Push once  dextrose 50% Injectable 25 Gram(s) IV Push once  enoxaparin Injectable 40 milliGRAM(s) SubCutaneous daily  glucagon  Injectable 1 milliGRAM(s) IntraMuscular once  insulin glargine Injectable (LANTUS) 15 Unit(s) SubCutaneous at bedtime  insulin lispro (ADMELOG) corrective regimen sliding scale   SubCutaneous three times a day before meals  insulin lispro (ADMELOG) corrective regimen sliding scale   SubCutaneous at bedtime  insulin lispro Injectable (ADMELOG) 5 Unit(s) SubCutaneous three times a day with meals  lactated ringers. 1000 milliLiter(s) (125 mL/Hr) IV Continuous <Continuous>  lactobacillus acidophilus 1 Tablet(s) Oral three times a day with meals  lisinopril 10 milliGRAM(s) Oral daily  piperacillin/tazobactam IVPB.. 3.375 Gram(s) IV Intermittent every 8 hours  vancomycin  IVPB 1250 milliGRAM(s) IV Intermittent every 12 hours    MEDICATIONS  (PRN):  acetaminophen   Tablet .. 650 milliGRAM(s) Oral every 6 hours PRN Temp greater or equal to 38C (100.4F), Mild Pain (1 - 3), Moderate Pain (4 - 6)

## 2021-06-20 ENCOUNTER — TRANSCRIPTION ENCOUNTER (OUTPATIENT)
Age: 51
End: 2021-06-20

## 2021-06-20 VITALS — OXYGEN SATURATION: 99 % | TEMPERATURE: 98 F | RESPIRATION RATE: 16 BRPM | HEART RATE: 90 BPM

## 2021-06-20 LAB
A1C WITH ESTIMATED AVERAGE GLUCOSE RESULT: 10.5 % — HIGH (ref 4–5.6)
ALBUMIN SERPL ELPH-MCNC: 3.6 G/DL — SIGNIFICANT CHANGE UP (ref 3.3–5.2)
ALP SERPL-CCNC: 70 U/L — SIGNIFICANT CHANGE UP (ref 40–120)
ALT FLD-CCNC: 42 U/L — HIGH
ANION GAP SERPL CALC-SCNC: 10 MMOL/L — SIGNIFICANT CHANGE UP (ref 5–17)
AST SERPL-CCNC: 39 U/L — SIGNIFICANT CHANGE UP
BASOPHILS # BLD AUTO: 0.05 K/UL — SIGNIFICANT CHANGE UP (ref 0–0.2)
BASOPHILS NFR BLD AUTO: 0.5 % — SIGNIFICANT CHANGE UP (ref 0–2)
BILIRUB SERPL-MCNC: 0.4 MG/DL — SIGNIFICANT CHANGE UP (ref 0.4–2)
BUN SERPL-MCNC: 7.6 MG/DL — LOW (ref 8–20)
CALCIUM SERPL-MCNC: 8.7 MG/DL — SIGNIFICANT CHANGE UP (ref 8.6–10.2)
CHLORIDE SERPL-SCNC: 103 MMOL/L — SIGNIFICANT CHANGE UP (ref 98–107)
CO2 SERPL-SCNC: 24 MMOL/L — SIGNIFICANT CHANGE UP (ref 22–29)
COVID-19 SPIKE DOMAIN AB INTERP: POSITIVE
COVID-19 SPIKE DOMAIN ANTIBODY RESULT: >250 U/ML — HIGH
CREAT SERPL-MCNC: 0.62 MG/DL — SIGNIFICANT CHANGE UP (ref 0.5–1.3)
EOSINOPHIL # BLD AUTO: 0.28 K/UL — SIGNIFICANT CHANGE UP (ref 0–0.5)
EOSINOPHIL NFR BLD AUTO: 2.5 % — SIGNIFICANT CHANGE UP (ref 0–6)
ESTIMATED AVERAGE GLUCOSE: 255 MG/DL — HIGH (ref 68–114)
GLUCOSE BLDC GLUCOMTR-MCNC: 241 MG/DL — HIGH (ref 70–99)
GLUCOSE BLDC GLUCOMTR-MCNC: 275 MG/DL — HIGH (ref 70–99)
GLUCOSE SERPL-MCNC: 258 MG/DL — HIGH (ref 70–99)
HCT VFR BLD CALC: 37.4 % — LOW (ref 39–50)
HGB BLD-MCNC: 12.3 G/DL — LOW (ref 13–17)
IMM GRANULOCYTES NFR BLD AUTO: 0.5 % — SIGNIFICANT CHANGE UP (ref 0–1.5)
LACTATE SERPL-SCNC: 2 MMOL/L — SIGNIFICANT CHANGE UP (ref 0.5–2)
LYMPHOCYTES # BLD AUTO: 2.98 K/UL — SIGNIFICANT CHANGE UP (ref 1–3.3)
LYMPHOCYTES # BLD AUTO: 27.1 % — SIGNIFICANT CHANGE UP (ref 13–44)
MCHC RBC-ENTMCNC: 28.3 PG — SIGNIFICANT CHANGE UP (ref 27–34)
MCHC RBC-ENTMCNC: 32.9 GM/DL — SIGNIFICANT CHANGE UP (ref 32–36)
MCV RBC AUTO: 86.2 FL — SIGNIFICANT CHANGE UP (ref 80–100)
MONOCYTES # BLD AUTO: 0.72 K/UL — SIGNIFICANT CHANGE UP (ref 0–0.9)
MONOCYTES NFR BLD AUTO: 6.6 % — SIGNIFICANT CHANGE UP (ref 2–14)
NEUTROPHILS # BLD AUTO: 6.91 K/UL — SIGNIFICANT CHANGE UP (ref 1.8–7.4)
NEUTROPHILS NFR BLD AUTO: 62.8 % — SIGNIFICANT CHANGE UP (ref 43–77)
PLATELET # BLD AUTO: 307 K/UL — SIGNIFICANT CHANGE UP (ref 150–400)
POTASSIUM SERPL-MCNC: 4 MMOL/L — SIGNIFICANT CHANGE UP (ref 3.5–5.3)
POTASSIUM SERPL-SCNC: 4 MMOL/L — SIGNIFICANT CHANGE UP (ref 3.5–5.3)
PROT SERPL-MCNC: 6.9 G/DL — SIGNIFICANT CHANGE UP (ref 6.6–8.7)
RBC # BLD: 4.34 M/UL — SIGNIFICANT CHANGE UP (ref 4.2–5.8)
RBC # FLD: 12.5 % — SIGNIFICANT CHANGE UP (ref 10.3–14.5)
SARS-COV-2 IGG+IGM SERPL QL IA: >250 U/ML — HIGH
SARS-COV-2 IGG+IGM SERPL QL IA: POSITIVE
SODIUM SERPL-SCNC: 137 MMOL/L — SIGNIFICANT CHANGE UP (ref 135–145)
VANCOMYCIN TROUGH SERPL-MCNC: 4.9 UG/ML — LOW (ref 10–20)
WBC # BLD: 10.99 K/UL — HIGH (ref 3.8–10.5)
WBC # FLD AUTO: 10.99 K/UL — HIGH (ref 3.8–10.5)

## 2021-06-20 PROCEDURE — 99232 SBSQ HOSP IP/OBS MODERATE 35: CPT

## 2021-06-20 RX ORDER — ACETAMINOPHEN 500 MG
2 TABLET ORAL
Qty: 0 | Refills: 0 | DISCHARGE
Start: 2021-06-20

## 2021-06-20 RX ORDER — INSULIN GLARGINE 100 [IU]/ML
20 INJECTION, SOLUTION SUBCUTANEOUS
Qty: 1 | Refills: 0
Start: 2021-06-20 | End: 2021-07-19

## 2021-06-20 RX ORDER — SACCHAROMYCES BOULARDII 250 MG
1 POWDER IN PACKET (EA) ORAL
Qty: 60 | Refills: 0
Start: 2021-06-20 | End: 2021-07-19

## 2021-06-20 RX ORDER — ISOPROPYL ALCOHOL, BENZOCAINE .7; .06 ML/ML; ML/ML
1 SWAB TOPICAL
Qty: 100 | Refills: 1
Start: 2021-06-20 | End: 2021-08-08

## 2021-06-20 RX ORDER — INSULIN LISPRO 100/ML
5 VIAL (ML) SUBCUTANEOUS
Qty: 1 | Refills: 0
Start: 2021-06-20 | End: 2021-07-19

## 2021-06-20 RX ORDER — METFORMIN HYDROCHLORIDE 850 MG/1
2 TABLET ORAL
Qty: 0 | Refills: 0 | DISCHARGE

## 2021-06-20 RX ORDER — ENOXAPARIN SODIUM 100 MG/ML
20 INJECTION SUBCUTANEOUS
Qty: 1 | Refills: 0
Start: 2021-06-20 | End: 2021-07-19

## 2021-06-20 RX ORDER — LISINOPRIL 2.5 MG/1
1 TABLET ORAL
Qty: 30 | Refills: 0
Start: 2021-06-20 | End: 2021-07-19

## 2021-06-20 RX ADMIN — Medication 5 UNIT(S): at 11:15

## 2021-06-20 RX ADMIN — Medication 4: at 11:15

## 2021-06-20 RX ADMIN — PIPERACILLIN AND TAZOBACTAM 25 GRAM(S): 4; .5 INJECTION, POWDER, LYOPHILIZED, FOR SOLUTION INTRAVENOUS at 06:19

## 2021-06-20 RX ADMIN — ENOXAPARIN SODIUM 40 MILLIGRAM(S): 100 INJECTION SUBCUTANEOUS at 11:14

## 2021-06-20 RX ADMIN — Medication 5 UNIT(S): at 07:53

## 2021-06-20 RX ADMIN — Medication 1 TABLET(S): at 10:10

## 2021-06-20 RX ADMIN — LISINOPRIL 10 MILLIGRAM(S): 2.5 TABLET ORAL at 06:20

## 2021-06-20 RX ADMIN — SODIUM CHLORIDE 125 MILLILITER(S): 9 INJECTION, SOLUTION INTRAVENOUS at 06:20

## 2021-06-20 RX ADMIN — Medication 6: at 07:53

## 2021-06-20 RX ADMIN — Medication 166.67 MILLIGRAM(S): at 02:48

## 2021-06-20 RX ADMIN — Medication 1 TABLET(S): at 11:14

## 2021-06-20 NOTE — DISCHARGE NOTE PROVIDER - CARE PROVIDERS DIRECT ADDRESSES
[email or web address],DirectAddress_Unknown,DirectAddress_Unknown

## 2021-06-20 NOTE — DISCHARGE NOTE NURSING/CASE MANAGEMENT/SOCIAL WORK - PATIENT PORTAL LINK FT
You can access the FollowMyHealth Patient Portal offered by [facility] by registering at the following website: [email or web address]. By joining [employer]’s FollowMyHealth portal, you will also be able to view your health information using other applications (apps) compatible with our system.

## 2021-06-20 NOTE — DISCHARGE NOTE PROVIDER - CARE PROVIDER_API CALL
Yash Pal  INFECTIOUS DISEASE  [address]  ,  [facility]  Phone: [phone]  Fax: [phone]  Follow Up Time:     PMD,   in 1 week, please call the office and get an appiontment  Phone: (   )    -  Fax: (   )    -  Follow Up Time:     Bryson Milton)  Plastic Surgery; Surgery of the Hand  [address]  [place] [address]  Phone: [phone]  Fax: [phone]  Follow Up Time:

## 2021-06-20 NOTE — PROGRESS NOTE ADULT - ASSESSMENT
This  52 y/o male with hx of HTN, DM, HLD presents to the ED c/o right 2nd finger pain and swelling mostly in the middle and distal phalanx area. As per pt. began as a hang nail and has gradually worsened. pt. was amoxicillin for infection which he likely got it from a [place] Pt notes he just finished antibiotics yesterday but has not improved. Admits to some discharge of the finger. Denies fevers, chills, decreased range of motion of rt. hand or fingers , nausea, vomiting. no cp, no sob, no abd. pain, no n/v/d. pt. stated that is only medicine is metformin.  (18 Jun 2021 23:58)    he presented to his PMD after 10 days and the was sent to the ER>   s/p I&D of the right medial #2 finger tip.  packing in place    has some pain.   denies nail biting   works as a     Impression:  finger infection  strep anginosus infection  finger pain  WBC elevation  diabetes in poor control      Plan:  off VANCO  can change ZOSYN to Augmentin 875mg PO BID x 14 days  Wound care per surgical team         Regarding Diabetes - poor control  - needs adequate control for wound healing  - most recent A1c is: 10.3        WBC elevation is reactive; NORMALIZED  WBC Count: 10.99 K/uL (06-20-21 @ 07:04)  WBC Count: 12.52 K/uL (06-19-21 @ 04:23)  WBC Count: 13.33 K/uL (06-18-21 @ 20:35)        No further specific infectious disease recommendations. Will be available as needed.      Consider discharge to community from ID point of view once antibiotics have been arranged.    
50 y/o male with hx of HTN, DM, HLD presents to the ED c/o right 2nd finger pain and swelling mostly in the middle and distal phalanx area. As per pt. began as a hang nail and has gradually worsened. pt. was amoxicillin for infection which he likely got it from a [place]. Pt notes he just finished antibiotics yesterday but has not improved. Admits to some discharge of the finger.Denies fevers, chills, decreased range of motion of rt. hand or fingers , nausea, vomiting. no cp, no sob, no abd. pain, no n/v/d. pt. stated that is only medicine is metformin.      Problem/Plan - 1:  ·  Problem: Felon of finger of right hand.  Plan: pt. had bedside I & D by ortho PA, packing done, cultures sent, result pending, will continue on vanco and zosyn. Hand surgeon dr. perez, will request for ID consult.      Problem/Plan - 2:  ·  Problem: Sepsis without acute organ dysfunction, due to unspecified organism.  Plan: pt's wbc 49032, hr 99, lactate initially 2.8 ,obvious source rt. finger felon, follow blood and abscess culture sent after I & D. VANCO / ZOSYN to continue, last lactate 2.3 coming down 2.1, will give IV fluids, will monitor lactate level.      Problem/Plan - 3:  ·  Problem: Type 2 diabetes mellitus with hyperglycemia, without long-term current use of insulin.  Plan: pt's DM appears uncontrolled, will increase lantus to 15 and premeal to 5 from 2, diabetic teaching, hb a1c is 10     Problem/Plan - 4:  ·  Problem: Obesity (BMI 30-39.9).  Plan: pt. 's Obesity was discussed and pt. will benefit from losing wt.     DVT prophylaxis: Lovenox sc.

## 2021-06-20 NOTE — PROGRESS NOTE ADULT - SUBJECTIVE AND OBJECTIVE BOX
[facility]  INFECTIOUS DISEASES AND INTERNAL MEDICINE at [place]  =======================================================  Krsitian Walker MD  Diplomates American Board of Internal Medicine and Infectious Diseases  Tel  [phone]  Fax [phone]  =======================================================    [id number]  OSCAR REYES   follow up for   right medial #2 finger tip infection,   strep anginosis found in cultures from abscess     no fevers  less pain today    I have personally reviewed the labs and data; pertinent labs and data are listed in this note; please see below.   =======================================================  Past Medical & Surgical Hx:  =====================  PAST MEDICAL & SURGICAL HISTORY:  HTN (hypertension)    Diabetes    HLD (hyperlipidemia)    No significant past surgical history      Problem List:  ==========  HEALTH ISSUES - PROBLEM Dx:  Felon of finger of right hand  Felon of finger of right hand    Type 2 diabetes mellitus with hyperglycemia, without long-term current use of insulin  Type 2 diabetes mellitus with hyperglycemia, without long-term current use of insulin    Obesity (BMI 30-39.9)  Obesity (BMI 30-39.9)    Sepsis without acute organ dysfunction, due to unspecified organism  Sepsis without acute organ dysfunction, due to unspecified organism           Social Hx:  =======  no toxic habits currently    FAMILY HISTORY:  Family history of diabetes mellitus  mother and sibling    no significant family history of immunosuppressive disorders in mother or father   =======================================================    REVIEW OF SYSTEMS:  CONSTITUTIONAL:  No Fever or chills  HEENT:  No diplopia or blurred vision.  No earache, sore throat or runny nose.  CARDIOVASCULAR:  No pressure, squeezing, strangling, tightness, heaviness or aching about the chest, neck, axilla or epigastrium.  RESPIRATORY:  No cough, shortness of breath  GASTROINTESTINAL:  No nausea, vomiting or diarrhea.  GENITOURINARY:  No dysuria, frequency or urgency. No Blood in urine  MUSCULOSKELETAL:  no joint aches, no muscle pain  SKIN: as per HPI  NEUROLOGIC:  No Headaches, seizures or weakness.  PSYCHIATRIC:  No disorder of thought or mood.  ENDOCRINE:  No heat or cold intolerance  HEMATOLOGICAL:  No easy bruising or bleeding.    =======================================================  Allergies  No Known Allergies       ======================================================  Physical Exam:  ============     General:  No acute distress.  OBESE  Eye: Pupils are equal, round and reactive to light, Extraocular movements are intact, Normal conjunctiva.  HENT: Normocephalic, Oral mucosa is moist, No pharyngeal erythema, No sinus tenderness.  Neck: Supple, No lymphadenopathy.  Respiratory: Lungs are clear to auscultation, Respirations are non-labored.  Cardiovascular: Normal rate, Regular rhythm,   Gastrointestinal: Soft, Non-tender, Non-distended, Normal bowel sounds.  Genitourinary: No costovertebral angle tenderness.  Lymphatics: No lymphadenopathy neck,   Musculoskeletal: Normal range of motion, Normal strength.  Integumentary: RIGHT #2 finger tip with incision and PACKING in the medial finger.  FINGER TIP LESS RED. WRINKLES NOTED, LESS SWELLING  Neurologic: Alert, Oriented, No focal deficits, Cranial Nerves II-XII are grossly intact.  Psychiatric: Appropriate mood & affect.    =======================================================  Vitals:  ============  T(F): 98.9 (20 Jun 2021 04:05), Max: 98.9 (20 Jun 2021 04:05)  HR: 84 (20 Jun 2021 04:05)  BP: 124/85 (20 Jun 2021 04:05)  RR: 18 (20 Jun 2021 04:05)  SpO2: 95% (20 Jun 2021 04:05) (95% - 95%)  temp max in last 48H T(F): , Max: 99.8 (06-18-21 @ 18:18)    =======================================================  Current Antibiotics:  piperacillin/tazobactam IVPB.. 3.375 Gram(s) IV Intermittent every 8 hours    Other medications:  dextrose 40% Gel 15 Gram(s) Oral once  dextrose 5%. 1000 milliLiter(s) IV Continuous <Continuous>  dextrose 5%. 1000 milliLiter(s) IV Continuous <Continuous>  dextrose 50% Injectable 25 Gram(s) IV Push once  dextrose 50% Injectable 12.5 Gram(s) IV Push once  dextrose 50% Injectable 25 Gram(s) IV Push once  enoxaparin Injectable 40 milliGRAM(s) SubCutaneous daily  glucagon  Injectable 1 milliGRAM(s) IntraMuscular once  insulin glargine Injectable (LANTUS) 15 Unit(s) SubCutaneous at bedtime  insulin lispro (ADMELOG) corrective regimen sliding scale   SubCutaneous three times a day before meals  insulin lispro (ADMELOG) corrective regimen sliding scale   SubCutaneous at bedtime  insulin lispro Injectable (ADMELOG) 5 Unit(s) SubCutaneous three times a day with meals  lactated ringers. 1000 milliLiter(s) IV Continuous <Continuous>  lactobacillus acidophilus 1 Tablet(s) Oral three times a day with meals  lisinopril 10 milliGRAM(s) Oral daily      =======================================================  Labs:                        12.3   10.99 )-----------( 307      ( 20 Jun 2021 07:04 )             37.4     06-20    137  |  103  |  7.6<L>  ----------------------------<  258<H>  4.0   |  24.0  |  0.62    Ca    8.7      20 Jun 2021 07:04    TPro  6.9  /  Alb  3.6  /  TBili  0.4  /  DBili  x   /  AST  39  /  ALT  42<H>  /  AlkPhos  70  06-20      Culture - Abscess with Gram Stain (06.19.21 @ 02:09)   Specimen Source: .Abscess right 2nd digit felon   Culture Results:   Few Streptococcus anginosus   "Susceptibilities not performed"       C-Reactive Protein, Serum: <4 mg/L (06-18-21 @ 20:35)    Sedimentation Rate, Erythrocyte: 18 mm/hr (06-18-21 @ 20:35)        COVID-19 PCR: NotDetec (06-18-21 @ 21:08)

## 2021-06-20 NOTE — DISCHARGE NOTE PROVIDER - NSDCFUADDAPPT_GEN_ALL_CORE_FT
use 20 units of Basiglar insulin 20 units at bedtime and 5 units of admilog insulin with meals and if your sugar level are lower then 100 before giving admilog insulin then skip the dose, check your glucose level 3 times a day and keep a log and show to your PCP,  continue with metformin 1gm twice a day.     If you feel like sweating, weakness, confusion your sugar level might be low, check your sugar level and take glucose pills, if your glucometer is not then take glucose pill and once you have our meter with you then check.

## 2021-06-20 NOTE — DISCHARGE NOTE PROVIDER - NSDCFUADDINST_GEN_ALL_CORE_FT
do wound dressing as you were told by Orthopedic team.   please call Infectious disease doctors office and get an appointment in 2 weeks   Follow-up with Dr. Milton in 1-2 weeks.  Change packing daily x 3 days, then place dry dressings on finger.  Take Augmentin antibiotic as prescribed x 14 days  Elevate hand  please call Infectious disease doctors office and get an appointment in 2 weeks

## 2021-06-20 NOTE — PROGRESS NOTE ADULT - SUBJECTIVE AND OBJECTIVE BOX
Pt Name: OSCAR REYES  MRN: [id number]      Patient is a 51y Male being followed for right 2nd digit felon s/p bedside I&D on 6/18. Patient seen and examined at bedside. Patient is doing well, noting significant improvement to his finger. Pain is controlled with the prescribed medication. Denies fevers/chils, CP, SOB, N/V, numbness/tingling. No other orthopedic complaints.        PAST MEDICAL & SURGICAL HISTORY:  HTN (hypertension)    Diabetes    HLD (hyperlipidemia)    No significant past surgical history        Allergies: No Known Allergies      Vital Signs Last 24 Hrs  T(C): 36.9 (20 Jun 2021 11:20), Max: 37.2 (20 Jun 2021 04:05)  T(F): 98.5 (20 Jun 2021 11:20), Max: 98.9 (20 Jun 2021 04:05)  HR: 87 (20 Jun 2021 11:20) (84 - 91)  BP: 124/85 (20 Jun 2021 04:05) (124/85 - 162/113)  BP(mean): --  RR: 18 (20 Jun 2021 11:20) (18 - 19)  SpO2: 97% (20 Jun 2021 11:20) (95% - 97%)  Daily     Daily                           12.3   10.99 )-----------( 307      ( 20 Jun 2021 07:04 )             37.4     06-20    137  |  103  |  7.6<L>  ----------------------------<  258<H>  4.0   |  24.0  |  0.62    Ca    8.7      20 Jun 2021 07:04    TPro  6.9  /  Alb  3.6  /  TBili  0.4  /  DBili  x   /  AST  39  /  ALT  42<H>  /  AlkPhos  70  06-20      PHYSICAL EXAM:    Appearance: Alert, responsive, in no acute distress.    Musculoskeletal:      Right Upper Extremity: +dressing and packing removed. erythema to finger decreasing from last exam. No drainage or discharge. New packing placed. No abrasions, ecchymosis. No bleeding. Decreased flexion at 2nd DIP secondary to swelling, otherwise FROM hand. Sensation is grossly intact to light touch. Radial pulses 2+. Cap refill < 2 seconds. No cyanosis.       Culture - Abscess with Gram Stain (06.19.21 @ 02:09)    Specimen Source: .Abscess right 2nd digit felon    Culture Results:   Few Streptococcus anginosus  "Susceptibilities not performed"          A/P:  Pt is a  51y Male with  right 2nd digit felon s/p bedside I&D on 6/18. Improving, WBC trending down    PLAN:   * ortho stable  * As per ID, okay to d/c on PO Augmentin 875mg PO BID x 14 days  * Patient can continue at-home packing changes for a few more days, discussed with patient who understands and is in agreement with plan, states he is capable of doing the packing changes. Provided instruction during exam.  * Pain control  * DVTp  * Continue care as per primary team Pt Name: OSCAR REYES  MRN: [id number]      Patient is a 51y Male being followed for right 2nd digit felon s/p bedside I&D on 6/18. Patient seen and examined at bedside with language line assistance ([id number]). Patient is doing well, noting significant improvement to his finger. Pain is controlled with the prescribed medication. Denies fevers/chils, CP, SOB, N/V, numbness/tingling. No other orthopedic complaints.        PAST MEDICAL & SURGICAL HISTORY:  HTN (hypertension)    Diabetes    HLD (hyperlipidemia)    No significant past surgical history        Allergies: No Known Allergies      Vital Signs Last 24 Hrs  T(C): 36.9 (20 Jun 2021 11:20), Max: 37.2 (20 Jun 2021 04:05)  T(F): 98.5 (20 Jun 2021 11:20), Max: 98.9 (20 Jun 2021 04:05)  HR: 87 (20 Jun 2021 11:20) (84 - 91)  BP: 124/85 (20 Jun 2021 04:05) (124/85 - 162/113)  BP(mean): --  RR: 18 (20 Jun 2021 11:20) (18 - 19)  SpO2: 97% (20 Jun 2021 11:20) (95% - 97%)  Daily     Daily                           12.3   10.99 )-----------( 307      ( 20 Jun 2021 07:04 )             37.4     06-20    137  |  103  |  7.6<L>  ----------------------------<  258<H>  4.0   |  24.0  |  0.62    Ca    8.7      20 Jun 2021 07:04    TPro  6.9  /  Alb  3.6  /  TBili  0.4  /  DBili  x   /  AST  39  /  ALT  42<H>  /  AlkPhos  70  06-20      PHYSICAL EXAM:    Appearance: Alert, responsive, in no acute distress.    Musculoskeletal:      Right Upper Extremity: +dressing and packing removed. erythema to finger decreasing from last exam. No drainage or discharge. New packing placed. No abrasions, ecchymosis. No bleeding. Decreased flexion at 2nd DIP secondary to swelling, otherwise FROM hand. Sensation is grossly intact to light touch. Radial pulses 2+. Cap refill < 2 seconds. No cyanosis.       Culture - Abscess with Gram Stain (06.19.21 @ 02:09)    Specimen Source: .Abscess right 2nd digit felon    Culture Results:   Few Streptococcus anginosus  "Susceptibilities not performed"          A/P:  Pt is a  51y Male with  right 2nd digit felon s/p bedside I&D on 6/18. Improving, WBC trending down    PLAN:   * ortho stable  * As per ID, okay to d/c on PO Augmentin 875mg PO BID x 14 days  * Patient can continue at-home packing changes for a few more days, discussed with patient who understands and is in agreement with plan, states he is capable of doing the packing changes. Provided instruction during exam.  * Pain control  * DVTp  * Continue care as per primary team

## 2021-06-20 NOTE — DISCHARGE NOTE PROVIDER - PROVIDER TOKENS
PROVIDER:[TOKEN:[[id number]]],FREE:[LAST:[PMD],PHONE:[(   )    -],FAX:[(   )    -],ADDRESS:[in 1 week, please call the office and get an appiontment]],PROVIDER:[TOKEN:[[id number]]]

## 2021-06-20 NOTE — DISCHARGE NOTE PROVIDER - HOSPITAL COURSE
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 and has gradually worsened, he got amoxicillin for infection, he just finished antibiotics but has not improved, he also had some discharge from the finger, he was admitted under medicine for further management of the sepsis without acute organ dysfunction, Felon of finger of right hand, his wbc [id number], lactate initially 2.8, patient was give IV fluids, blood cultures were obtained and Ortho was consulted and was continued with VANCO / ZOSYN, lactate monitored over the course and it came down to normal, sepsis resolved, he had bedside I & D by ortho, packing done, cultures sent, his cultures from his wound came positive for Streptococcus Anginosis, seen and followed by ID as per ID change antibiotics to augmentin 875mg BID for 14 days, and follow up with ID in 2 weeks, as per Ortho Patient can continue at-home packing changes for a few more days, they discussed with patient who understands and is in agreement with plan, states he is capable of doing the packing changes  he was noted to have Uncontrolled type 2 diabetes mellitus with hyperglycemia, his hb a1c was noted to be 10, as per patient his doctor changed his metformin 1gm BID, he was started on 12 on Lantus and prandial and coverage insulin, increase Lantus to 15 and pre meal to 5 from 2, but FS is still high, he was told to he is going to use 20 units of lantus insulin,  and 5 units of admilog insulin with meals and if his sugar level are lower then 100 he is skipping the dose, diabetic teaching provided as well, he has glucometer at home, he was told to check 3 times a day and keep a log and show to your PCP, he was told to continue with his metformin 1gm twice a day.   patient has been started on low dose lisinopril 10mg daily.   patient has hx of Obesity, counselled for lossing weight , diet and exercise.   patient is doing ok, he symptoms has been resolved, he is being discharged home in a stable condition.     Vital Signs Last 24 Hrs  T(C): 36.9 (20 Jun 2021 11:20), Max: 37.2 (20 Jun 2021 04:05)  T(F): 98.5 (20 Jun 2021 11:20), Max: 98.9 (20 Jun 2021 04:05)  HR: 87 (20 Jun 2021 11:20) (84 - 91)  BP: 124/85 (20 Jun 2021 04:05) (124/85 - 162/113)  RR: 18 (20 Jun 2021 11:20) (18 - 19)  SpO2: 97% (20 Jun 2021 11:20) (95% - 97%)     PHYSICAL EXAM:    GENERAL: Young Obese male looking comfortable   HEENT: PERRL, +EOMI  NECK: soft, Supple, No JVD,   CHEST/LUNG: Clear to auscultate bilaterally; No wheezing  HEART: S1S2+, Regular rate and rhythm; No murmurs  ABDOMEN: Soft, Nontender, Nondistended; Bowel sounds present  EXTREMITIES:  2+ Peripheral Pulses, No edema, right 2nd finger with dressing on, some soaking, no bleeding  SKIN: No rashes or lesions  NEURO: AAOX3, no focal deficits, no motor r sensory loss  PSYCH: normal mood    Total time spent 38minutes

## 2021-06-21 PROCEDURE — 85652 RBC SED RATE AUTOMATED: CPT

## 2021-06-21 PROCEDURE — 87070 CULTURE OTHR SPECIMN AEROBIC: CPT

## 2021-06-21 PROCEDURE — 87040 BLOOD CULTURE FOR BACTERIA: CPT

## 2021-06-21 PROCEDURE — 96374 THER/PROPH/DIAG INJ IV PUSH: CPT

## 2021-06-21 PROCEDURE — 83036 HEMOGLOBIN GLYCOSYLATED A1C: CPT

## 2021-06-21 PROCEDURE — 96375 TX/PRO/DX INJ NEW DRUG ADDON: CPT

## 2021-06-21 PROCEDURE — 85025 COMPLETE CBC W/AUTO DIFF WBC: CPT

## 2021-06-21 PROCEDURE — 73140 X-RAY EXAM OF FINGER(S): CPT

## 2021-06-21 PROCEDURE — 80202 ASSAY OF VANCOMYCIN: CPT

## 2021-06-21 PROCEDURE — 80053 COMPREHEN METABOLIC PANEL: CPT

## 2021-06-21 PROCEDURE — 80061 LIPID PANEL: CPT

## 2021-06-21 PROCEDURE — 87077 CULTURE AEROBIC IDENTIFY: CPT

## 2021-06-21 PROCEDURE — 82962 GLUCOSE BLOOD TEST: CPT

## 2021-06-21 PROCEDURE — 83605 ASSAY OF LACTIC ACID: CPT

## 2021-06-21 PROCEDURE — 86140 C-REACTIVE PROTEIN: CPT

## 2021-06-21 PROCEDURE — 87635 SARS-COV-2 COVID-19 AMP PRB: CPT

## 2021-06-21 PROCEDURE — 80048 BASIC METABOLIC PNL TOTAL CA: CPT

## 2021-06-21 PROCEDURE — 99285 EMERGENCY DEPT VISIT HI MDM: CPT | Mod: 25

## 2021-06-21 PROCEDURE — 86769 SARS-COV-2 COVID-19 ANTIBODY: CPT

## 2021-06-21 PROCEDURE — 36415 COLL VENOUS BLD VENIPUNCTURE: CPT

## 2021-06-21 PROCEDURE — 87205 SMEAR GRAM STAIN: CPT

## 2021-06-22 PROBLEM — E78.5 HYPERLIPIDEMIA, UNSPECIFIED: Chronic | Status: ACTIVE | Noted: 2021-06-18

## 2021-06-22 PROBLEM — E11.9 TYPE 2 DIABETES MELLITUS WITHOUT COMPLICATIONS: Chronic | Status: ACTIVE | Noted: 2021-06-18

## 2021-06-23 LAB
CULTURE RESULTS: SIGNIFICANT CHANGE UP
SPECIMEN SOURCE: SIGNIFICANT CHANGE UP

## 2021-06-24 ENCOUNTER — APPOINTMENT (OUTPATIENT)
Dept: INTERNAL MEDICINE | Facility: CLINIC | Age: 51
End: 2021-06-24
Payer: MEDICAID

## 2021-06-24 VITALS
HEIGHT: 66 IN | SYSTOLIC BLOOD PRESSURE: 138 MMHG | WEIGHT: 230 LBS | OXYGEN SATURATION: 96 % | HEART RATE: 84 BPM | TEMPERATURE: 98.2 F | BODY MASS INDEX: 36.96 KG/M2 | DIASTOLIC BLOOD PRESSURE: 99 MMHG

## 2021-06-24 DIAGNOSIS — A49.1 STREPTOCOCCAL INFECTION, UNSPECIFIED SITE: ICD-10-CM

## 2021-06-24 DIAGNOSIS — E11.65 TYPE 2 DIABETES MELLITUS WITH HYPERGLYCEMIA: ICD-10-CM

## 2021-06-24 DIAGNOSIS — L08.9 LOCAL INFECTION OF THE SKIN AND SUBCUTANEOUS TISSUE, UNSPECIFIED: ICD-10-CM

## 2021-06-24 PROCEDURE — 99214 OFFICE O/P EST MOD 30 MIN: CPT

## 2021-06-24 NOTE — HISTORY OF PRESENT ILLNESS
[FreeTextEntry1] : This 52 y/o male with hx of HTN, DM, HLD presents to the ED c/o right 2nd\par finger pain and swelling mostly in the middle and distal phalanx area. As per\par pt. began as a hang nail and has gradually worsened. pt. was amoxicillin for\par infection which he likely got it from a [place] Pt notes he just finished\par antibiotics yesterday but has not improved. Admits to some discharge of the\par finger. Denies fevers, chills, decreased range of motion of rt. hand or fingers\par , nausea, vomiting. no cp, no sob, no abd. pain, no n/v/d. pt. stated that is\par only medicine is metformin. (18 Jun 2021 23:58)\par \par he presented to his PMD after 10 days and the was sent to the ER>\par s/p I&D of the right medial #2 finger tip.\par packing in place\par \par has some pain.\par denies nail biting\par works as a \par \par Was seen on June 20, 2021 for:\par finger infection\par strep anginosus infection\par finger pain\par WBC elevation\par diabetes in poor control\par \par \par He was changed over to Augmentin 875 mg by mouth twice a day for 14 days.  He is now here for follow-up.  He is doing well.  There is decreased swelling of the right index finger.  There is insignificant amount of drainage.\par \par No fevers reported.  He still needs some help when using his hand and lifting things.

## 2021-06-24 NOTE — DATA REVIEWED
[No studies available for review at this time.] : No studies available for review at this time. [FreeTextEntry1] : \par Labs:\par \par  12.3\par 10.99 )-----------( 307 ( 20 Jun 2021 07:04 )\par  37.4\par \par 06-20\par \par 137 | 103 | 7.6<L>\par ----------------------------< 258<H>\par 4.0 | 24.0 | 0.62\par \par Ca 8.7 20 Jun 2021 07:04\par \par TPro 6.9 / Alb 3.6 / TBili 0.4 / DBili x / AST 39 / ALT 42<H>\par / AlkPhos 70 06-20\par \par \par Culture - Abscess with Gram Stain (06.19.21 @ 02:09)\par Specimen Source: .Abscess right 2nd digit felon\par Culture Results:\par Few Streptococcus anginosus\par "Susceptibilities not performed"\par \par \par C-Reactive Protein, Serum: <4 mg/L (06-18-21 @ 20:35)\par \par Sedimentation Rate, Erythrocyte: 18 mm/hr (06-18-21 @ 20:35)\par \par

## 2021-06-24 NOTE — PHYSICAL EXAM
[General Appearance - Alert] : alert [General Appearance - In No Acute Distress] : in no acute distress [PERRL With Normal Accommodation] : pupils were equal in size, round, reactive to light [Sclera] : the sclera and conjunctiva were normal [Extraocular Movements] : extraocular movements were intact [Outer Ear] : the ears and nose were normal in appearance [Oropharynx] : the oropharynx was normal with no thrush [Neck Appearance] : the appearance of the neck was normal [Neck Cervical Mass (___cm)] : no neck mass was observed [Jugular Venous Distention Increased] : there was no jugular-venous distention [Thyroid Diffuse Enlargement] : the thyroid was not enlarged [Auscultation Breath Sounds / Voice Sounds] : lungs were clear to auscultation bilaterally [Heart Rate And Rhythm] : heart rate was normal and rhythm regular [Heart Sounds] : normal S1 and S2 [Heart Sounds Gallop] : no gallops [Murmurs] : no murmurs [Heart Sounds Pericardial Friction Rub] : no pericardial rub [Full Pulse] : the pedal pulses are present [Edema] : there was no peripheral edema [Bowel Sounds] : normal bowel sounds [Abdomen Soft] : soft [Abdomen Tenderness] : non-tender [] : no hepato-splenomegaly [Abdomen Mass (___ Cm)] : no abdominal mass palpated [Costovertebral Angle Tenderness] : no CVA tenderness [No Palpable Adenopathy] : no palpable adenopathy [Musculoskeletal - Swelling] : no joint swelling [Nail Clubbing] : no clubbing  or cyanosis of the fingernails [Motor Tone] : muscle strength and tone were normal [Skin Color & Pigmentation] : normal skin color and pigmentation [FreeTextEntry1] : right medial index finger, on the distal phalanx with small ulceration, good wound base.  Minimum drainage.  Finger overall is slightly swollen. [Cranial Nerves] : cranial nerves 2-12 were intact [Sensation] : the sensory exam was normal to light touch and pinprick [Motor Exam] : the motor exam was normal [No Focal Deficits] : no focal deficits [Oriented To Time, Place, And Person] : oriented to person, place, and time

## 2021-06-24 NOTE — ASSESSMENT
[FreeTextEntry1] : Patient is doing clinically better.  He is to continue and complete his course of Augmentin.\par \par   He can change his dressings to Betadine application to affected finger, covered by dry sterile gauze, then wrapped with Monica dressing.  He can do dressing changes daily. \par \par  Patient is to follow-up with his primary care doctor for control of his diabetes.  He is also to follow-up with this office in 1 month. \par \par  Anticipate that at that time in follow-up, he would have complete resolution of his finger infection. [Treatment Education] : treatment education [Treatment Adherence] : treatment adherence [Rx Dose / Side Effects] : Rx dose/side effects [Anticipatory Guidance] : anticipatory guidance

## 2022-07-15 NOTE — ED ADULT TRIAGE NOTE - SPO2 (%)
Alert and oriented to person, place, time/situation. normal mood and affect. no apparent risk to self or others. 96

## 2023-11-10 ENCOUNTER — OFFICE (OUTPATIENT)
Dept: URBAN - METROPOLITAN AREA CLINIC 94 | Facility: CLINIC | Age: 53
Setting detail: OPHTHALMOLOGY
End: 2023-11-10
Payer: COMMERCIAL

## 2023-11-10 DIAGNOSIS — H02.825: ICD-10-CM

## 2023-11-10 PROBLEM — H01.00 BLEPHARITIS; RIGHT UPPER LID, LEFT UPPER LID: Status: ACTIVE | Noted: 2023-11-10

## 2023-11-10 PROBLEM — H00.1 CHALAZION; RIGHT LOWER LID, LEFT LOWER LID: Status: ACTIVE | Noted: 2023-11-10

## 2023-11-10 PROCEDURE — 92285 EXTERNAL OCULAR PHOTOGRAPHY: CPT | Performed by: OPHTHALMOLOGY

## 2023-11-10 PROCEDURE — 99214 OFFICE O/P EST MOD 30 MIN: CPT | Performed by: OPHTHALMOLOGY

## 2023-11-10 ASSESSMENT — REFRACTION_AUTOREFRACTION
OD_SPHERE: -1.00
OS_AXIS: 095
OD_CYLINDER: -0.50
OS_SPHERE: -0.75
OD_AXIS: 074
OS_CYLINDER: -1.75

## 2023-11-10 ASSESSMENT — REFRACTION_CURRENTRX
OS_ADD: +1.50
OS_OVR_VA: 20/
OD_CYLINDER: -1.00
OD_VPRISM_DIRECTION: BF
OD_AXIS: 059
OS_VPRISM_DIRECTION: BF
OS_CYLINDER: -2.00
OD_OVR_VA: 20/
OD_SPHERE: -0.50
OD_ADD: +1.75
OS_SPHERE: -1.25
OS_AXIS: 097

## 2023-11-10 ASSESSMENT — LID EXAM ASSESSMENTS
OD_BLEPHARITIS: T
OS_BLEPHARITIS: T

## 2023-11-10 ASSESSMENT — SPHEQUIV_DERIVED
OS_SPHEQUIV: -1.625
OD_SPHEQUIV: -1.25